# Patient Record
Sex: FEMALE | Race: WHITE | NOT HISPANIC OR LATINO | Employment: FULL TIME | URBAN - METROPOLITAN AREA
[De-identification: names, ages, dates, MRNs, and addresses within clinical notes are randomized per-mention and may not be internally consistent; named-entity substitution may affect disease eponyms.]

---

## 2024-06-30 ENCOUNTER — OFFICE VISIT (OUTPATIENT)
Dept: URGENT CARE | Facility: URGENT CARE | Age: 59
End: 2024-06-30
Payer: COMMERCIAL

## 2024-06-30 VITALS
DIASTOLIC BLOOD PRESSURE: 80 MMHG | SYSTOLIC BLOOD PRESSURE: 134 MMHG | TEMPERATURE: 97.4 F | HEIGHT: 64 IN | OXYGEN SATURATION: 100 % | HEART RATE: 56 BPM

## 2024-06-30 DIAGNOSIS — H11.31 SUBCONJUNCTIVAL HEMORRHAGE OF RIGHT EYE: Primary | ICD-10-CM

## 2024-06-30 PROCEDURE — 99203 OFFICE O/P NEW LOW 30 MIN: CPT | Performed by: INTERNAL MEDICINE

## 2024-06-30 RX ORDER — ESCITALOPRAM OXALATE 10 MG/1
1 TABLET ORAL
COMMUNITY
Start: 2024-05-06

## 2024-06-30 NOTE — PROGRESS NOTES
"SUBJECTIVE:  Ariana Lorenzo, a 58 year old female, presents for evaluation of a red eye.  Had some crusting this AM.  Noted some drainage and a gritty sensation. Has been traveling and attending public events.     OBJECTIVE:  /80 (BP Location: Left arm, Patient Position: Sitting, Cuff Size: Adult Large)   Pulse 56   Temp 97.4  F (36.3  C) (Tympanic)   Ht 1.626 m (5' 4\")   SpO2 100%   GEN: alert and interactive  EYES: subconjunctival hemorrhage is present over the medial portion of the sclera on the right; conjunctiva are otherwise clear; no ocular discharge  HEENT: OP clear; bilateral clear tympanic membranes    ASSESSMENT/PLAN:    ICD-10-CM    1. Subconjunctival hemorrhage of right eye  H11.31         Reassurance.  Symptomatic cares.    Walter Snell MD   "